# Patient Record
Sex: MALE | Race: WHITE | Employment: FULL TIME | URBAN - METROPOLITAN AREA
[De-identification: names, ages, dates, MRNs, and addresses within clinical notes are randomized per-mention and may not be internally consistent; named-entity substitution may affect disease eponyms.]

---

## 2022-11-01 ENCOUNTER — OFFICE VISIT (OUTPATIENT)
Dept: NEUROSURGERY | Age: 52
End: 2022-11-01
Payer: COMMERCIAL

## 2022-11-01 VITALS
BODY MASS INDEX: 39.08 KG/M2 | TEMPERATURE: 98.1 F | WEIGHT: 273 LBS | HEART RATE: 75 BPM | HEIGHT: 70 IN | OXYGEN SATURATION: 98 % | DIASTOLIC BLOOD PRESSURE: 94 MMHG | SYSTOLIC BLOOD PRESSURE: 160 MMHG | RESPIRATION RATE: 10 BRPM

## 2022-11-01 DIAGNOSIS — M47.26 OTHER SPONDYLOSIS WITH RADICULOPATHY, LUMBAR REGION: Primary | ICD-10-CM

## 2022-11-01 DIAGNOSIS — M47.819 FACET ARTHROPATHY, MULTILEVEL: ICD-10-CM

## 2022-11-01 PROCEDURE — 99204 OFFICE O/P NEW MOD 45 MIN: CPT | Performed by: NURSE PRACTITIONER

## 2022-11-01 RX ORDER — TIZANIDINE 2 MG/1
2 TABLET ORAL PRN
COMMUNITY

## 2022-11-01 RX ORDER — AMLODIPINE BESYLATE 5 MG/1
5 TABLET ORAL DAILY
COMMUNITY

## 2022-11-01 RX ORDER — GABAPENTIN 300 MG/1
300 CAPSULE ORAL 2 TIMES DAILY
COMMUNITY

## 2022-11-01 NOTE — PROGRESS NOTES
[address]  [facility] [address], [address]  Dept: [phone]    Patient:  Johnna Wilcox  YOB: 1970  Date: 11/1/22    The patient is a 46 y.o. male who presents today for consult of the following problems:     Chief Complaint   Patient presents with    New Patient         HPI:     Munir Jese OMID Wilcox is a 46 y.o. male on whom neurosurgical consultation was requested by Madeline Linda for management of back and leg pain. Symptoms started more than a year ago. Started to right lower back/hip, started radiating down right leg, burning sensation. Burning/tingling down right leg in approximate L5/S1 distribution. Denies saddle anesthesia, no changes to bowels or bladder. Has been doing home exercises. Has been seeing Dr Nela Moreno, pain management. Has received an injection on the right side, this did help with right leg. Has plans for bilateral injections upcoming, waiting for insurance approval to schedule. Has completed recent MRI and XR. History:     History reviewed. No pertinent past medical history. History reviewed. No pertinent surgical history. History reviewed. No pertinent family history. Current Outpatient Medications on File Prior to Visit   Medication Sig Dispense Refill    tiZANidine (ZANAFLEX) 2 MG tablet Take 2 mg by mouth as needed      amLODIPine (NORVASC) 5 MG tablet Take 5 mg by mouth daily      gabapentin (NEURONTIN) 300 MG capsule Take 300 mg by mouth in the morning and at bedtime. No current facility-administered medications on file prior to visit. Social History     Tobacco Use    Smoking status: Never    Smokeless tobacco: Never   Substance Use Topics    Alcohol use: Never    Drug use: Never       No Known Allergies    Review of Systems  Constitutional: Negative for activity change and appetite change.    HENT: Negative for ear pain and facial swelling. Eyes: Negative for discharge and itching. Respiratory: Negative for choking and chest tightness. Cardiovascular: Negative for chest pain and leg swelling. Gastrointestinal: Negative for nausea and abdominal pain. Endocrine: Negative for cold intolerance and heat intolerance. Genitourinary: Negative for frequency and flank pain. Musculoskeletal: Negative for myalgias and joint swelling. Skin: Negative for rash and wound. Allergic/Immunologic: Negative for environmental allergies and food allergies. Hematological: Negative for adenopathy. Does not bruise/bleed easily. Psychiatric/Behavioral: Negative for self-injury. The patient is not nervous/anxious. Physical Exam:      BP (!) 160/94 (Site: Right Upper Arm, Position: Sitting, Cuff Size: Large Adult)   Pulse 75   Temp 98.1 °F (36.7 °C) (Temporal)   Resp 10   Ht 5' 10\" (1.778 m)   Wt 273 lb (123.8 kg)   SpO2 98%   BMI 39.17 kg/m²   Estimated body mass index is 39.17 kg/m² as calculated from the following:    Height as of this encounter: 5' 10\" (1.778 m). Weight as of this encounter: 273 lb (123.8 kg). General:  Yessica Jones. Dennis Fermin is a 46y.o. year old male who appears his stated age. HEENT: Normocephalic atraumatic. Neck supple. Chest: regular rate; pulses equal  Abdomen: Soft nontender nondistended.   Ext: DP and PT pulses 2+, good cap refill  Neuro    Mentation  Appropriate affect  Registration intact  Orientation intact  Judgement intact to situation    Cranial Nerves:   Pupils equal and reactive to light  Extraocular motion intact  Face and shrug symmetric  Tongue midline  No dysarthria  v1-3 sensation symmetric, masseter tone symmetric  Hearing symmetric    Sensation: mildly decreased RLE in L5/S1    Motor  L deltoid 5/5; R deltoid 5/5  L biceps 5/5; R biceps 5/5  L triceps 5/5; R triceps 5/5  L wrist extension 5/5; R wrist extension 5/5  L intrinsics 5/5; R intrinsics 5/5     L iliopsoas 5/5 , R iliopsoas 5/5  L quadriceps 5/5; R quadriceps 5/5  L Dorsiflexion 5/5; R dorsiflexion 5/5  L Plantarflexion 5/5; R plantarflexion 5/5  L EHL 5/5; R EHL 5/5    Reflexes  L Brachioradialis 2+/4; R brachioradialis 2+/4  L Biceps 2+/4; R Biceps 2+/4  L Triceps 2+/4; R Triceps 2+/4  L Patellar 2+/4: R Patellar 2+/4  L Achilles 2+/4; R Achilles 2+/4    hoffmans L: neg  hoffmans R: neg  Clonus L: neg  Clonus R: neg  Babinski L: neg  Babinski R: neg    Studies Review:     MRI lumbar 8/17/2022:  Mild to moderate multilevel degenerative changes, no high-grade central or foraminal stenosis    X-ray lumbar 9/6/2022:  Multilevel degenerative changes, most significant at L3/4, L5/S1, mild retrolisthesis at L5-S1, diffuse facet hypertrophy    PCP notes reviewed    Assessment and Plan:      1. Other spondylosis with radiculopathy, lumbar region    2. Facet arthropathy, multilevel          Plan: Patient with predominantly low back pain with radiation to right lower extremity. Symptoms currently improved following recent pain management intervention. Has upcoming plans for additional interventions. Recommend initiation of multimodal physical therapy to work on core strengthening, stretching, range of motion, back strengthening. Patient to follow-up in 8-12 weeks for reevaluation after completion of therapy. Followup: Return in about 3 months (around 2/1/2023), or if symptoms worsen or fail to improve. Prescriptions Ordered:  No orders of the defined types were placed in this encounter.      Orders Placed:  Orders Placed This Encounter   Procedures    External Referral To Physical Therapy     Referral Priority:   Routine     Referral Type:   Eval and Treat     Referral Reason:   Specialty Services Required     Requested Specialty:   Physical Therapist     Number of Visits Requested:   1          Electronically signed by MIKE Andujar CNP on 11/1/2022 at 10:15 AM    Please note that this chart was generated using voice recognition Dragon dictation software. Although every effort was made to ensure the accuracy of this automated transcription, some errors in transcription may have occurred.

## 2023-02-09 ENCOUNTER — OFFICE VISIT (OUTPATIENT)
Dept: NEUROSURGERY | Age: 53
End: 2023-02-09

## 2023-02-09 VITALS
WEIGHT: 283.4 LBS | HEART RATE: 86 BPM | HEIGHT: 70 IN | TEMPERATURE: 97.3 F | SYSTOLIC BLOOD PRESSURE: 138 MMHG | DIASTOLIC BLOOD PRESSURE: 97 MMHG | OXYGEN SATURATION: 97 % | BODY MASS INDEX: 40.57 KG/M2

## 2023-02-09 DIAGNOSIS — M47.819 FACET ARTHROPATHY, MULTILEVEL: ICD-10-CM

## 2023-02-09 DIAGNOSIS — M47.26 OTHER SPONDYLOSIS WITH RADICULOPATHY, LUMBAR REGION: Primary | ICD-10-CM

## 2023-02-09 PROCEDURE — 99214 OFFICE O/P EST MOD 30 MIN: CPT | Performed by: NURSE PRACTITIONER

## 2023-02-09 RX ORDER — AMLODIPINE AND OLMESARTAN MEDOXOMIL 5; 20 MG/1; MG/1
TABLET ORAL
COMMUNITY
Start: 2023-02-03

## 2023-02-09 NOTE — LETTER
[address] [facility], [address] [place] [address] [address] [place] [address]  Phone: [phone]  Fax: MIKE Anderson CNP        February 9, 2023     Patient: Natalie Medina   YOB: 1970   Date of Visit: 2/9/2023       To Whom it May Concern:    Shaniquasoham Alonjocelynqamar was seen in my clinic on 2/9/2023. He is under our care for lumbar radiculopathy. If you have any questions or concerns, please don't hesitate to call.     Sincerely,         MIKE Berg - CNP

## 2023-02-09 NOTE — PROGRESS NOTES
[address]  [facility] [address], [address]  Dept: [phone]    Patient:  Kath Woods  YOB: 1970  Date: 2/9/2023    The patient is a 46 y.o. male who presents today for consult of the following problems:     Chief Complaint   Patient presents with    Back Pain     Back pain on right and left side. PM injections are not helping as much. HPI:     [place] Rahman is a 46 y.o. male on whom neurosurgical consultation was requested by Stacy Pinon for management of back and leg pain. Symptoms started more than a year ago. Started to right lower back/hip, started radiating down right leg, burning sensation. Burning/tingling down right leg in approximate L5/S1 distribution. Denies saddle anesthesia, no changes to bowels or bladder. Has been doing home exercises. Has been seeing Dr Chang Son, pain management. Has received an injection on the right side, this did help with right leg. Has plans for bilateral injections upcoming, waiting for insurance approval to schedule. Has completed recent MRI and XR. Having issues with right leg giving out  Completed PT, tried traction, dry needling, stretching, TENS  Upcoming PM appt on the 21st  Pain is 2-5/10 to lower back. Currently with improved lower extremity symptoms  Still with some right leg tingling in approximate L5/S distribution    Completed around 12 weeks of PT    Injection  1st shot-helped a couple of months-bilateral L 4, L5 transforaminal LEXX  2nd shot only helped a couple of weeks  3rd shot-worsened pain initially, but then provided fair amount of relief-sciatic-right SI joint    History:     History reviewed. No pertinent past medical history. History reviewed. No pertinent surgical history. History reviewed. No pertinent family history.   Current Outpatient Medications on File Prior to Visit   Medication Sig Dispense Refill    amLODIPine-olmesartan (BANG) 5-20 MG per tablet TAKE 1 TABLET BY MOUTH EVERY DAY      tiZANidine (ZANAFLEX) 2 MG tablet Take 2 mg by mouth as needed      gabapentin (NEURONTIN) 300 MG capsule Take 300 mg by mouth in the morning and at bedtime. amLODIPine (NORVASC) 5 MG tablet Take 5 mg by mouth daily (Patient not taking: Reported on 2/9/2023)       No current facility-administered medications on file prior to visit. Social History     Tobacco Use    Smoking status: Never    Smokeless tobacco: Never   Substance Use Topics    Alcohol use: Never    Drug use: Never       No Known Allergies    Review of Systems  Constitutional: Negative for activity change and appetite change. HENT: Negative for ear pain and facial swelling. Eyes: Negative for discharge and itching. Respiratory: Negative for choking and chest tightness. Cardiovascular: Negative for chest pain and leg swelling. Gastrointestinal: Negative for nausea and abdominal pain. Endocrine: Negative for cold intolerance and heat intolerance. Genitourinary: Negative for frequency and flank pain. Musculoskeletal: Negative for myalgias and joint swelling. Skin: Negative for rash and wound. Allergic/Immunologic: Negative for environmental allergies and food allergies. Hematological: Negative for adenopathy. Does not bruise/bleed easily. Psychiatric/Behavioral: Negative for self-injury. The patient is not nervous/anxious. Physical Exam:      BP (!) 138/97 (Site: Right Upper Arm, Position: Sitting, Cuff Size: Large Adult)   Pulse 86   Temp 97.3 °F (36.3 °C) (Temporal)   Ht 5' 10\" (1.778 m)   Wt 283 lb 6.4 oz (128.5 kg)   SpO2 97%   BMI 40.66 kg/m²   Estimated body mass index is 40.66 kg/m² as calculated from the following:    Height as of this encounter: 5' 10\" (1.778 m). Weight as of this encounter: 283 lb 6.4 oz (128.5 kg). General:  Ghislaine Sanderson.  Andrea Thayer is a 46y.o. year old male who appears his stated age. HEENT: Normocephalic atraumatic. Neck supple. Chest: regular rate; pulses equal  Abdomen: Soft nontender nondistended. Ext: DP and PT pulses 2+, good cap refill  Neuro    Mentation  Appropriate affect  Registration intact  Orientation intact  Judgement intact to situation    Cranial Nerves:   Pupils equal and reactive to light  Extraocular motion intact  Face and shrug symmetric  Tongue midline  No dysarthria  v1-3 sensation symmetric, masseter tone symmetric  Hearing symmetric    Sensation: mildly decreased RLE in L5/S1    Motor  L deltoid 5/5; R deltoid 5/5  L biceps 5/5; R biceps 5/5  L triceps 5/5; R triceps 5/5  L wrist extension 5/5; R wrist extension 5/5  L intrinsics 5/5; R intrinsics 5/5     L iliopsoas 5/5 , R iliopsoas 4/5  L quadriceps 5/5; R quadriceps 4/5  L Dorsiflexion 5/5; R dorsiflexion 5/5  L Plantarflexion 5/5; R plantarflexion 5/5  L EHL 5/5; R EHL 5/5    Reflexes  L Brachioradialis 2+/4; R brachioradialis 2+/4  L Biceps 2+/4; R Biceps 2+/4  L Triceps 2+/4; R Triceps 2+/4  L Patellar 2+/4: R Patellar 2+/4  L Achilles 2+/4; R Achilles 2+/4    hoffmans L: neg  hoffmans R: neg  Clonus L: neg  Clonus R: neg  Babinski L: neg  Babinski R: neg    Studies Review:     MRI lumbar 8/17/2022:  Mild to moderate multilevel degenerative changes, no high-grade central or foraminal stenosis    X-ray lumbar 9/6/2022:  Multilevel degenerative changes, most significant at L3/4, L5/S1, mild retrolisthesis at L5-S1, diffuse facet hypertrophy    Pain management notes reviewed    Assessment and Plan:      1. Other spondylosis with radiculopathy, lumbar region    2. Facet arthropathy, multilevel            Plan: Patient with predominantly low back pain with radiation to right lower extremity. Does have some right lower extremity weakness. Does have upcoming plans for repeat interventions with pain specialist, okay to proceed with this.   Okay to continue restrictions for work as managed per PCP to limit certain work activities, pulling that because right leg to give out. Patient to return for follow-up with surgeon in 6 to 8 weeks. Followup: Return in about 6 weeks (around 3/23/2023), or if symptoms worsen or fail to improve. Prescriptions Ordered:  No orders of the defined types were placed in this encounter. Orders Placed:  No orders of the defined types were placed in this encounter. Electronically signed by MIKE Beckman CNP on 2/9/2023 at 3:57 PM    Please note that this chart was generated using voice recognition Dragon dictation software. Although every effort was made to ensure the accuracy of this automated transcription, some errors in transcription may have occurred.

## 2023-05-04 ENCOUNTER — OFFICE VISIT (OUTPATIENT)
Dept: NEUROSURGERY | Age: 53
End: 2023-05-04
Payer: COMMERCIAL

## 2023-05-04 VITALS
HEART RATE: 88 BPM | SYSTOLIC BLOOD PRESSURE: 133 MMHG | BODY MASS INDEX: 40.52 KG/M2 | OXYGEN SATURATION: 98 % | HEIGHT: 70 IN | TEMPERATURE: 98.6 F | DIASTOLIC BLOOD PRESSURE: 88 MMHG | WEIGHT: 283 LBS

## 2023-05-04 DIAGNOSIS — M47.816 FACET ARTHROPATHY, LUMBAR: Primary | ICD-10-CM

## 2023-05-04 DIAGNOSIS — M47.816 SPONDYLOSIS OF LUMBAR SPINE: ICD-10-CM

## 2023-05-04 PROCEDURE — 99214 OFFICE O/P EST MOD 30 MIN: CPT | Performed by: NEUROLOGICAL SURGERY

## 2023-05-04 NOTE — PROGRESS NOTES
Department of Neurosurgery                                                      Follow up visit      History Obtained From:  patient    CHIEF COMPLAINT:         Chief Complaint   Patient presents with    Follow-up     4-8 weeks. HISTORY OF PRESENT ILLNESS:       The patient is a 46 y.o. male who presents for follow up for spondylosis with radiculopathy to the lumbar region. Patient's first time seeing Dr. Nkechi Carreon, previously seen with Phoebe Membreno. Patient reports that he has been experiencing back pain for about 4 years. He reports that he went to a chiropractor and was referred to pain management, Dr. Dionna Adkins. Has had multiple injections. Denies having an SI injection done. Currently patient reports right low back pain as well as numbness in radiating down the right leg. He reports that he completed PT and had no relief. Laying on the floor and icing the back helps relieve the back pain. Walking up to an hour and sitting for a long period of time will worsen the pain. Tightness when bending forward. Works full-time. PAST MEDICAL HISTORY :       Past Medical History:    No past medical history on file. Past Surgical History:    No past surgical history on file.     Social History:   Social History     Socioeconomic History    Marital status: Single     Spouse name: Not on file    Number of children: Not on file    Years of education: Not on file    Highest education level: Not on file   Occupational History    Not on file   Tobacco Use    Smoking status: Never    Smokeless tobacco: Never   Substance and Sexual Activity    Alcohol use: Never    Drug use: Never    Sexual activity: Not on file   Other Topics Concern    Not on file   Social History Narrative    Not on file     Social Determinants of Health     Financial Resource Strain: Not on file   Food Insecurity: Not on file   Transportation Needs: Not on file   Physical Activity: Not on file   Stress: Not on file   Social

## 2023-05-22 ENCOUNTER — HOSPITAL ENCOUNTER (OUTPATIENT)
Dept: CT IMAGING | Age: 53
Discharge: HOME | End: 2023-05-24
Payer: COMMERCIAL

## 2023-05-22 DIAGNOSIS — M47.816 FACET ARTHROPATHY, LUMBAR: ICD-10-CM

## 2023-05-22 DIAGNOSIS — M47.816 SPONDYLOSIS OF LUMBAR SPINE: ICD-10-CM

## 2023-05-22 PROCEDURE — 72131 CT LUMBAR SPINE W/O DYE: CPT

## 2023-08-10 ENCOUNTER — OFFICE VISIT (OUTPATIENT)
Dept: NEUROSURGERY | Age: 53
End: 2023-08-10
Payer: COMMERCIAL

## 2023-08-10 VITALS
DIASTOLIC BLOOD PRESSURE: 90 MMHG | BODY MASS INDEX: 40.52 KG/M2 | HEART RATE: 86 BPM | SYSTOLIC BLOOD PRESSURE: 160 MMHG | WEIGHT: 283 LBS | OXYGEN SATURATION: 94 % | RESPIRATION RATE: 18 BRPM | HEIGHT: 70 IN

## 2023-08-10 DIAGNOSIS — M47.816 SPONDYLOSIS OF LUMBAR SPINE: Primary | ICD-10-CM

## 2023-08-10 DIAGNOSIS — M53.3 SACROILIAC JOINT DISEASE: ICD-10-CM

## 2023-08-10 DIAGNOSIS — M48.061 BILATERAL STENOSIS OF LATERAL RECESS OF LUMBAR SPINE: ICD-10-CM

## 2023-08-10 PROCEDURE — 99214 OFFICE O/P EST MOD 30 MIN: CPT | Performed by: NEUROLOGICAL SURGERY

## 2023-08-17 ENCOUNTER — HOSPITAL ENCOUNTER (OUTPATIENT)
Dept: MRI IMAGING | Age: 53
Discharge: HOME | End: 2023-08-19
Payer: COMMERCIAL

## 2023-08-17 DIAGNOSIS — M47.816 SPONDYLOSIS OF LUMBAR SPINE: ICD-10-CM

## 2023-08-17 DIAGNOSIS — M48.061 BILATERAL STENOSIS OF LATERAL RECESS OF LUMBAR SPINE: ICD-10-CM

## 2023-08-17 PROCEDURE — 72148 MRI LUMBAR SPINE W/O DYE: CPT

## 2024-11-11 ENCOUNTER — APPOINTMENT (OUTPATIENT)
Dept: CARDIOLOGY | Facility: CLINIC | Age: 54
End: 2024-11-11
Payer: COMMERCIAL

## 2024-11-11 VITALS
HEIGHT: 70 IN | SYSTOLIC BLOOD PRESSURE: 130 MMHG | BODY MASS INDEX: 37.37 KG/M2 | DIASTOLIC BLOOD PRESSURE: 90 MMHG | WEIGHT: 261 LBS | HEART RATE: 72 BPM

## 2024-11-11 DIAGNOSIS — E11.9 DIABETES MELLITUS TYPE II, NON INSULIN DEPENDENT (MULTI): ICD-10-CM

## 2024-11-11 DIAGNOSIS — R07.89 OTHER CHEST PAIN: Primary | ICD-10-CM

## 2024-11-11 DIAGNOSIS — I10 PRIMARY HYPERTENSION: ICD-10-CM

## 2024-11-11 DIAGNOSIS — E78.2 MIXED HYPERLIPIDEMIA: ICD-10-CM

## 2024-11-11 DIAGNOSIS — I25.10 MILD CORONARY ARTERY DISEASE: ICD-10-CM

## 2024-11-11 PROBLEM — E78.5 HYPERLIPIDEMIA: Status: ACTIVE | Noted: 2023-07-27

## 2024-11-11 PROCEDURE — 99214 OFFICE O/P EST MOD 30 MIN: CPT | Performed by: INTERNAL MEDICINE

## 2024-11-11 PROCEDURE — 1036F TOBACCO NON-USER: CPT | Performed by: INTERNAL MEDICINE

## 2024-11-11 PROCEDURE — 3008F BODY MASS INDEX DOCD: CPT | Performed by: INTERNAL MEDICINE

## 2024-11-11 PROCEDURE — 4010F ACE/ARB THERAPY RXD/TAKEN: CPT | Performed by: INTERNAL MEDICINE

## 2024-11-11 PROCEDURE — 3080F DIAST BP >= 90 MM HG: CPT | Performed by: INTERNAL MEDICINE

## 2024-11-11 PROCEDURE — 3075F SYST BP GE 130 - 139MM HG: CPT | Performed by: INTERNAL MEDICINE

## 2024-11-11 RX ORDER — METFORMIN HYDROCHLORIDE 500 MG/1
500 TABLET ORAL 2 TIMES DAILY
COMMUNITY
Start: 2024-08-31

## 2024-11-11 RX ORDER — ASPIRIN 81 MG/1
81 TABLET ORAL DAILY
Qty: 90 TABLET | Refills: 3 | Status: SHIPPED | OUTPATIENT
Start: 2024-11-11 | End: 2025-11-11

## 2024-11-11 RX ORDER — LISINOPRIL 5 MG/1
5 TABLET ORAL DAILY
COMMUNITY

## 2024-11-11 RX ORDER — GLIMEPIRIDE 4 MG/1
4 TABLET ORAL
COMMUNITY

## 2024-11-11 RX ORDER — GABAPENTIN 300 MG/1
300 CAPSULE ORAL 2 TIMES DAILY
COMMUNITY

## 2024-11-11 RX ORDER — CARVEDILOL 3.12 MG/1
3.12 TABLET ORAL 2 TIMES DAILY
COMMUNITY
Start: 2024-07-30

## 2024-11-11 RX ORDER — PANTOPRAZOLE SODIUM 40 MG/1
40 TABLET, DELAYED RELEASE ORAL
COMMUNITY

## 2024-11-11 RX ORDER — IBUPROFEN 100 MG/5ML
1000 SUSPENSION, ORAL (FINAL DOSE FORM) ORAL
COMMUNITY

## 2024-11-11 RX ORDER — CALCITRIOL 0.5 UG/1
0.5 CAPSULE ORAL
COMMUNITY

## 2024-11-11 RX ORDER — OMEGA-3-ACID ETHYL ESTERS 1 G/1
1 CAPSULE, LIQUID FILLED ORAL 2 TIMES DAILY
COMMUNITY

## 2024-11-11 RX ORDER — ATORVASTATIN CALCIUM 40 MG/1
40 TABLET, FILM COATED ORAL DAILY
COMMUNITY

## 2024-11-11 ASSESSMENT — ENCOUNTER SYMPTOMS: SHORTNESS OF BREATH: 1

## 2024-11-11 NOTE — PATIENT INSTRUCTIONS
Please bring all medicines, vitamins, and herbal supplements with you when you come to the office.    Prescriptions will not be filled unless you are compliant with your follow up appointments or have a follow up appointment scheduled as per instruction of your physician. Refills should be requested at the time of your visit.     BMI was above normal measurement. Current weight: 118 kg (261 lb)  Weight change since last visit (-) denotes wt loss 261 lbs   Weight loss needed to achieve BMI 25: 87.1 Lbs  Weight loss needed to achieve BMI 30: 52.4 Lbs  Provided instructions on dietary changes.

## 2024-11-11 NOTE — PROGRESS NOTES
"Subjective   Jeff Hester is a 53 y.o. male       Chief Complaint    Follow-up          HPI   Patient is here for follow-up from recent hospitalization where he presented with chest pain shortness of breath.  Because of his multiple risk factors he underwent cardiac catheterization that showed only mild nonobstructive coronary artery disease.  He report he was diagnosed with gallbladder stones.  Effort now is focusing primarily on coronary risk factor adjustment.  He continues to complain of intermittent chest pain and shortness of breath appears to be nonexertional.    Assessment    1.  Recent evaluation for chest pain.  Because of his risk factor he underwent cardiac catheterization that showed only mild nonobstructive coronary artery disease of the LAD in the range of 30%  2.  Mild nonobstructive coronary artery disease affecting the LAD in the range of 30%  3.  Hyperlipidemia  4.  Essential hypertension  5.  Strong family history of coronary artery disease  6.  Type 2 diabetes mellitus  7.  Obesity with BMI 37  8.  Chronic back pain    Plan    1.  I reviewed with the patient result of his heart cath  2.  We focused on prevention.  We discussed coronary risk factor modification at great length  3.  The patient was advised to continue present medical regimen and continue aspirin therapy 81 mg daily  4.  Follow-up in 1 year  Review of Systems   Cardiovascular:  Positive for chest pain.   Respiratory:  Positive for shortness of breath.    All other systems reviewed and are negative.           Vitals:    11/11/24 0836   BP: 130/90   BP Location: Left arm   Patient Position: Sitting   Pulse: 72   Weight: 118 kg (261 lb)   Height: 1.778 m (5' 10\")        Objective   Physical Exam  Constitutional:       Appearance: Normal appearance.   HENT:      Nose: Nose normal.   Neck:      Vascular: No carotid bruit.   Cardiovascular:      Rate and Rhythm: Normal rate.      Pulses: Normal pulses.      Heart sounds: Normal heart " sounds.   Pulmonary:      Effort: Pulmonary effort is normal.   Abdominal:      General: Bowel sounds are normal.      Palpations: Abdomen is soft.   Musculoskeletal:         General: Normal range of motion.      Cervical back: Normal range of motion.      Right lower leg: No edema.      Left lower leg: No edema.   Skin:     General: Skin is warm and dry.   Neurological:      General: No focal deficit present.      Mental Status: He is alert.   Psychiatric:         Mood and Affect: Mood normal.         Behavior: Behavior normal.         Thought Content: Thought content normal.         Judgment: Judgment normal.         Allergies  Patient has no known allergies.     Current Medications    Current Outpatient Medications:     ascorbic acid (Vitamin C) 1,000 mg tablet, Take 1 tablet (1,000 mg) by mouth once daily., Disp: , Rfl:     atorvastatin (Lipitor) 40 mg tablet, Take 1 tablet (40 mg) by mouth once daily., Disp: , Rfl:     calcitriol (Rocaltrol) 0.5 mcg capsule, Take 1 capsule (0.5 mcg) by mouth once daily., Disp: , Rfl:     carvedilol (Coreg) 3.125 mg tablet, Take 1 tablet (3.125 mg) by mouth 2 times a day., Disp: , Rfl:     gabapentin (Neurontin) 300 mg capsule, Take 1 capsule (300 mg) by mouth twice a day., Disp: , Rfl:     glimepiride (Amaryl) 4 mg tablet, Take 1 tablet (4 mg) by mouth once daily in the morning. Take before meals., Disp: , Rfl:     lisinopril 5 mg tablet, Take 1 tablet (5 mg) by mouth once daily., Disp: , Rfl:     metFORMIN (Glucophage) 500 mg tablet, Take 1 tablet (500 mg) by mouth 2 times a day., Disp: , Rfl:     mv/Fe/FA/om3/fsh/lycop/lut/rochelle (MULTIA DAILY MULTIVITAMIN ORAL), Take by mouth once daily., Disp: , Rfl:     omega-3 acid ethyl esters (Lovaza) 1 gram capsule, Take 1 capsule (1 g) by mouth twice a day., Disp: , Rfl:     pantoprazole (ProtoNix) 40 mg EC tablet, Take 1 tablet (40 mg) by mouth once daily in the morning. Take before meals., Disp: , Rfl:     aspirin 81 mg EC tablet, Take  1 tablet (81 mg) by mouth once daily., Disp: 90 tablet, Rfl: 3                     Assessment/Plan   1. Other chest pain        2. Primary hypertension  Follow Up In Cardiology    aspirin 81 mg EC tablet      3. Mixed hyperlipidemia        4. BMI 37.0-37.9, adult        5. Mild coronary artery disease        6. Diabetes mellitus type II, non insulin dependent (Multi)                 Scribe Attestation  By signing my name below, IColette LPN Scribe   attest that this documentation has been prepared under the direction and in the presence of Xochilt Traore MD.     Provider Attestation - Scribe documentation    All medical record entries made by the Scribe were at my direction and personally dictated by me. I have reviewed the chart and agree that the record accurately reflects my personal performance of the history, physical exam, discussion and plan.

## 2024-11-11 NOTE — LETTER
November 11, 2024     Tonia Mcgowan, APRN-[facility]  [address] [place] [address]    Patient: Jeff Hester   YOB: 1970   Date of Visit: 11/11/2024       Dear Dr. Tonia Mcgowan, APRN-CNP:    Thank you for referring Jeff Hester to me for evaluation. Below are my notes for this consultation.  If you have questions, please do not hesitate to call me. I look forward to following your patient along with you.       Sincerely,     Xochilt Traore MD      CC: No Recipients  ______________________________________________________________________________________    Subjective   Jfef Hester is a 53 y.o. male       Chief Complaint    Follow-up          HPI   Patient is here for follow-up from recent hospitalization where he presented with chest pain shortness of breath.  Because of his multiple risk factors he underwent cardiac catheterization that showed only mild nonobstructive coronary artery disease.  He report he was diagnosed with gallbladder stones.  Effort now is focusing primarily on coronary risk factor adjustment.  He continues to complain of intermittent chest pain and shortness of breath appears to be nonexertional.    Assessment    1.  Recent evaluation for chest pain.  Because of his risk factor he underwent cardiac catheterization that showed only mild nonobstructive coronary artery disease of the LAD in the range of 30%  2.  Mild nonobstructive coronary artery disease affecting the LAD in the range of 30%  3.  Hyperlipidemia  4.  Essential hypertension  5.  Strong family history of coronary artery disease  6.  Type 2 diabetes mellitus  7.  Obesity with BMI 37  8.  Chronic back pain    Plan    1.  I reviewed with the patient result of his heart cath  2.  We focused on prevention.  We discussed coronary risk factor modification at great length  3.  The patient was advised to continue present medical regimen and continue aspirin therapy 81 mg daily  4.  Follow-up in 1 year  Review of  "Systems   Cardiovascular:  Positive for chest pain.   Respiratory:  Positive for shortness of breath.    All other systems reviewed and are negative.           Vitals:    11/11/24 0836   BP: 130/90   BP Location: Left arm   Patient Position: Sitting   Pulse: 72   Weight: 118 kg (261 lb)   Height: 1.778 m (5' 10\")        Objective   Physical Exam  Constitutional:       Appearance: Normal appearance.   HENT:      Nose: Nose normal.   Neck:      Vascular: No carotid bruit.   Cardiovascular:      Rate and Rhythm: Normal rate.      Pulses: Normal pulses.      Heart sounds: Normal heart sounds.   Pulmonary:      Effort: Pulmonary effort is normal.   Abdominal:      General: Bowel sounds are normal.      Palpations: Abdomen is soft.   Musculoskeletal:         General: Normal range of motion.      Cervical back: Normal range of motion.      Right lower leg: No edema.      Left lower leg: No edema.   Skin:     General: Skin is warm and dry.   Neurological:      General: No focal deficit present.      Mental Status: He is alert.   Psychiatric:         Mood and Affect: Mood normal.         Behavior: Behavior normal.         Thought Content: Thought content normal.         Judgment: Judgment normal.         Allergies  Patient has no known allergies.     Current Medications    Current Outpatient Medications:   •  ascorbic acid (Vitamin C) 1,000 mg tablet, Take 1 tablet (1,000 mg) by mouth once daily., Disp: , Rfl:   •  atorvastatin (Lipitor) 40 mg tablet, Take 1 tablet (40 mg) by mouth once daily., Disp: , Rfl:   •  calcitriol (Rocaltrol) 0.5 mcg capsule, Take 1 capsule (0.5 mcg) by mouth once daily., Disp: , Rfl:   •  carvedilol (Coreg) 3.125 mg tablet, Take 1 tablet (3.125 mg) by mouth 2 times a day., Disp: , Rfl:   •  gabapentin (Neurontin) 300 mg capsule, Take 1 capsule (300 mg) by mouth twice a day., Disp: , Rfl:   •  glimepiride (Amaryl) 4 mg tablet, Take 1 tablet (4 mg) by mouth once daily in the morning. Take before " meals., Disp: , Rfl:   •  lisinopril 5 mg tablet, Take 1 tablet (5 mg) by mouth once daily., Disp: , Rfl:   •  metFORMIN (Glucophage) 500 mg tablet, Take 1 tablet (500 mg) by mouth 2 times a day., Disp: , Rfl:   •  mv/Fe/FA/om3/fsh/lycop/lut/rochelle (MULTIA DAILY MULTIVITAMIN ORAL), Take by mouth once daily., Disp: , Rfl:   •  omega-3 acid ethyl esters (Lovaza) 1 gram capsule, Take 1 capsule (1 g) by mouth twice a day., Disp: , Rfl:   •  pantoprazole (ProtoNix) 40 mg EC tablet, Take 1 tablet (40 mg) by mouth once daily in the morning. Take before meals., Disp: , Rfl:   •  aspirin 81 mg EC tablet, Take 1 tablet (81 mg) by mouth once daily., Disp: 90 tablet, Rfl: 3                     Assessment/Plan   1. Other chest pain        2. Primary hypertension  Follow Up In Cardiology    aspirin 81 mg EC tablet      3. Mixed hyperlipidemia        4. BMI 37.0-37.9, adult        5. Mild coronary artery disease        6. Diabetes mellitus type II, non insulin dependent (Multi)                 Scribe Attestation  By signing my name below, I, Remi Arguelles LPNibglenn   attest that this documentation has been prepared under the direction and in the presence of Xochilt Traore MD.     Provider Attestation - Scribe documentation    All medical record entries made by the Scribe were at my direction and personally dictated by me. I have reviewed the chart and agree that the record accurately reflects my personal performance of the history, physical exam, discussion and plan.

## 2024-11-29 ENCOUNTER — APPOINTMENT (OUTPATIENT)
Dept: CARDIOLOGY | Facility: CLINIC | Age: 54
End: 2024-11-29
Payer: COMMERCIAL

## 2025-11-21 ENCOUNTER — APPOINTMENT (OUTPATIENT)
Dept: CARDIOLOGY | Facility: CLINIC | Age: 55
End: 2025-11-21
Payer: COMMERCIAL